# Patient Record
Sex: MALE | Race: WHITE
[De-identification: names, ages, dates, MRNs, and addresses within clinical notes are randomized per-mention and may not be internally consistent; named-entity substitution may affect disease eponyms.]

---

## 2021-11-17 ENCOUNTER — HOSPITAL ENCOUNTER (INPATIENT)
Dept: HOSPITAL 35 - ER | Age: 76
LOS: 4 days | Discharge: HOME | DRG: 177 | End: 2021-11-21
Attending: HOSPITALIST | Admitting: HOSPITALIST
Payer: MEDICARE

## 2021-11-17 VITALS — WEIGHT: 143 LBS | HEIGHT: 70 IN | BODY MASS INDEX: 20.47 KG/M2

## 2021-11-17 VITALS — DIASTOLIC BLOOD PRESSURE: 83 MMHG | SYSTOLIC BLOOD PRESSURE: 179 MMHG
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
## 2021-11-17 DIAGNOSIS — E78.5: ICD-10-CM

## 2021-11-17 DIAGNOSIS — I10: ICD-10-CM

## 2021-11-17 DIAGNOSIS — Z85.118: ICD-10-CM

## 2021-11-17 DIAGNOSIS — U07.1: Primary | ICD-10-CM

## 2021-11-17 DIAGNOSIS — F03.90: ICD-10-CM

## 2021-11-17 DIAGNOSIS — J96.01: ICD-10-CM

## 2021-11-17 DIAGNOSIS — Z92.21: ICD-10-CM

## 2021-11-17 DIAGNOSIS — E86.0: ICD-10-CM

## 2021-11-17 DIAGNOSIS — E46: ICD-10-CM

## 2021-11-17 DIAGNOSIS — J12.82: ICD-10-CM

## 2021-11-17 DIAGNOSIS — T50.905A: ICD-10-CM

## 2021-11-17 DIAGNOSIS — F17.210: ICD-10-CM

## 2021-11-17 DIAGNOSIS — J44.0: ICD-10-CM

## 2021-11-17 DIAGNOSIS — N40.0: ICD-10-CM

## 2021-11-17 DIAGNOSIS — Z79.899: ICD-10-CM

## 2021-11-17 DIAGNOSIS — Z71.6: ICD-10-CM

## 2021-11-17 DIAGNOSIS — J44.1: ICD-10-CM

## 2021-11-17 DIAGNOSIS — R53.81: ICD-10-CM

## 2021-11-17 LAB
ALBUMIN SERPL-MCNC: 3.1 G/DL (ref 3.4–5)
ALT SERPL-CCNC: 48 U/L (ref 30–65)
ANION GAP SERPL CALC-SCNC: 14 MMOL/L (ref 7–16)
AST SERPL-CCNC: 41 U/L (ref 15–37)
BACTERIA-REFLEX: (no result) /HPF
BASOPHILS NFR BLD AUTO: 0.3 % (ref 0–2)
BE(VIVO): -2.2 MMOL/L
BILIRUB DIRECT SERPL-MCNC: 0.2 MG/DL
BILIRUB SERPL-MCNC: 0.6 MG/DL (ref 0.2–1)
BILIRUB UR-MCNC: NEGATIVE MG/DL
BUN SERPL-MCNC: 13 MG/DL (ref 7–18)
CALCIUM SERPL-MCNC: 8.5 MG/DL (ref 8.5–10.1)
CHLORIDE SERPL-SCNC: 97 MMOL/L (ref 98–107)
CO2 SERPL-SCNC: 21 MMOL/L (ref 21–32)
COLOR UR: YELLOW
CREAT SERPL-MCNC: 1 MG/DL (ref 0.7–1.3)
EOSINOPHIL NFR BLD: 0 % (ref 0–3)
ERYTHROCYTE [DISTWIDTH] IN BLOOD BY AUTOMATED COUNT: 12.9 % (ref 10.5–14.5)
GLUCOSE SERPL-MCNC: 102 MG/DL (ref 74–106)
GRANULOCYTES NFR BLD MANUAL: 79.5 % (ref 36–66)
HCO3 BLD-SCNC: 20 MMOL/L (ref 22–26)
HCT VFR BLD CALC: 42.6 % (ref 42–52)
HGB BLD-MCNC: 14.2 GM/DL (ref 14–18)
KETONES UR STRIP-MCNC: (no result) MG/DL
LYMPHOCYTES NFR BLD AUTO: 10.6 % (ref 24–44)
MAGNESIUM SERPL-MCNC: 1.5 MG/DL (ref 1.8–2.4)
MCH RBC QN AUTO: 32.4 PG (ref 26–34)
MCHC RBC AUTO-ENTMCNC: 33.4 G/DL (ref 28–37)
MCV RBC: 97 FL (ref 80–100)
MONOCYTES NFR BLD: 9.6 % (ref 1–8)
MUCUS: (no result) STRN/LPF
NEUTROPHILS # BLD: 4.1 THOU/UL (ref 1.4–8.2)
PCO2 BLD: 28 MMHG (ref 35–45)
PHOSPHATE SERPL-MCNC: 2.8 MG/DL (ref 2.5–4.9)
PLATELET # BLD: 154 THOU/UL (ref 150–400)
PO2 BLD: 68.4 MMHG (ref 80–100)
POTASSIUM SERPL-SCNC: 3.8 MMOL/L (ref 3.5–5.1)
PROT SERPL-MCNC: 6.1 G/DL (ref 6.4–8.2)
RBC # BLD AUTO: 4.39 MIL/UL (ref 4.5–6)
RBC # UR STRIP: (no result) /UL
SODIUM SERPL-SCNC: 132 MMOL/L (ref 136–145)
SP GR UR STRIP: 1.01 (ref 1–1.03)
SQUAMOUS: (no result) /LPF (ref 0–3)
URINE CLARITY: CLEAR
URINE GLUCOSE-RANDOM*: NEGATIVE
URINE LEUKOCYTES-REFLEX: NEGATIVE
URINE NITRITE-REFLEX: NEGATIVE
URINE PROTEIN (DIPSTICK): NEGATIVE
URINE WBC-REFLEX: (no result) /HPF (ref 0–5)
UROBILINOGEN UR STRIP-ACNC: 0.2 E.U./DL (ref 0.2–1)
WBC # BLD AUTO: 5.1 THOU/UL (ref 4–11)

## 2021-11-17 PROCEDURE — 10080 I&D PILONIDAL CYST SIMPLE: CPT

## 2021-11-17 NOTE — EMS
[address]   [address]                     EMS Patient Care Report       
_______________________________________________________________________________
 
Name:       MARIANNE WEATHERS                  Room #:         170-5       ADM IN  
M.R.#:      [id number]                       Account #:      [id number]  
Admission:  21    Attend Phys:    Anthony Bui MD     
Discharge:              Date of Birth:  [birth year]  
                                                          Report #: [id number]
                                                                    [id number]
_______________________________________________________________________________
THIS REPORT FOR:   //name//                          
 
Report Transmitted: 2021 13:18
EMS Care Summary
[place], [place]/[facility]
Incident [id number] @ 2021 20:31
 
Incident Location
[address] [place], [place] [id number]
 
Patient
MARIANNE WEATHERS
Male, 75 Years
 1945
 
Patient Address
[address] [place], [place] [id number]
 
Patient History
None Reported,
 
Patient Allergies
No known allergies,
 
Patient Medications
None Reported,
 
Chief Complaint
weakness
 
Disposition
Transported No Lights/[place]
 
Dispatch Reason
Sick Person
 
Transported To
[facility]
 
Narrative
Upon arrival PT was standing outside of apartment complex. PT had a CC of 
weakness and stated that his wife had just been diagnosed with covid-19 and PT 
 
 
 
[address]   [address]                     EMS Patient Care Report       
_______________________________________________________________________________
 
Name:       MARIANNE WEATHERS                  Room #:         170-5       ADM IN  
M.RONAL.#:      [id number]                       Account #:      [id number]  
Admission:  21    Attend Phys:    Anthony Bui MD     
Discharge:              Date of Birth:  [birth year]  
                                                          Report #: [id number]
                                                                    [id number]
_______________________________________________________________________________
believes he has contracted the virus as well. PT was assisted to stretcher and 
taken to back of ambulance for further medical evaluation and intervention. PT 
was then monitored while en route for any change in condition. 
 
Initial Vitals
@20:42P: 66,R: 18,BP: 177/78,Pain: 0/10,GCS: 15,SpO2: 94,Revised Trauma: 12,
@20:51P: 90,R: 18,BP: 174/78,Pain: 0/10,GCS: 15,SpO2: 94,Revised Trauma: 12,
 
Assessments
@20:42MENTAL:No Abnormalities,SKIN:No Abnormalities,HEENT:Head/Face: No 
Abnormalities,Eyes: No Abnormalities,Neck/Airway: No Abnormalities,LUNG 
SOUNDS:General: No Abnormalities,Left Upper: No Abnormalities,Right Upper: No 
Abnormalities,Left Lower: No Abnormalities,Right Lower: No 
Abnormalities,ABDOMEN:General: No Abnormalities,Left Upper: No 
Abnormalities,Right Upper: No Abnormalities,Left Lower: No Abnormalities,Right 
Lower: No Abnormalities,PELVIS//GI:No Abnormalities,EXTREMITIES:Capillary 
Refill: Left Upper: < 2 Sec,Capillary Refill: Right Upper: < 2 
Sec,PULSE:Radial: 2+ Normal,NEURO:No Abnormalities, 
 
Impression
Generalized Weakness
 
Procedures
@20:42 ALS Assessment Response: UnchangedSucceeded
 
 
Timeline
20:29,Call Received
20:29,Dispatch Notified
20:31,Dispatched
20:31,En Route
20:41,On Scene
20:42,At Patient
20:42,BP: 177/78 M,PULSE: 66,RR: 18 R,SPO2: 94 Ox,ETCO2:  ,BG: ,PAIN: 0,GCS: 15,
20:42,Depart Scene
20:42,ALS Assessment,Response: UnchangedSucceeded,
20:51,BP: 174/78 M,PULSE: 90,RR: 18 R,SPO2: 94 Ox,ETCO2:  ,BG: ,PAIN: 0,GCS: 15,
20:52,At Destination
21:01,Call Closed
 
Disclaimer
v1.1     Copyright  [employer]
This EMS Care Summary contains data elements from the applicable legal record 
(which may be displayed differently). It is designed to provide pertinent 
information for the following purposes: continuity of care, clinical quality, 
and state data reporting. The complete legal record is available to ED staff 
 
 
 
[address]   [address]                     EMS Patient Care Report       
_______________________________________________________________________________
 
Name:       MARIANNE WEATHERS                  Room #:         170-5       ADM IN  
M.R.#:      [id number]                       Account #:      [id number]  
Admission:  21    Attend Phys:    Anthony Bui MD     
Discharge:              Date of Birth:  [birth year]  
                                                          Report #: [id number]
                                                                    [id number]
_______________________________________________________________________________
and administrators of the receiving hospital in [facility]. All data 
is provided "as is."

## 2021-11-18 VITALS — SYSTOLIC BLOOD PRESSURE: 146 MMHG | DIASTOLIC BLOOD PRESSURE: 50 MMHG

## 2021-11-18 VITALS — DIASTOLIC BLOOD PRESSURE: 71 MMHG | SYSTOLIC BLOOD PRESSURE: 127 MMHG

## 2021-11-18 VITALS — SYSTOLIC BLOOD PRESSURE: 137 MMHG | DIASTOLIC BLOOD PRESSURE: 83 MMHG

## 2021-11-18 LAB
ALBUMIN SERPL-MCNC: 2.8 G/DL (ref 3.4–5)
ALT SERPL-CCNC: 44 U/L (ref 30–65)
ANION GAP SERPL CALC-SCNC: 13 MMOL/L (ref 7–16)
AST SERPL-CCNC: 44 U/L (ref 15–37)
BILIRUB SERPL-MCNC: 0.5 MG/DL (ref 0.2–1)
BUN SERPL-MCNC: 12 MG/DL (ref 7–18)
CALCIUM SERPL-MCNC: 8 MG/DL (ref 8.5–10.1)
CHLORIDE SERPL-SCNC: 102 MMOL/L (ref 98–107)
CO2 SERPL-SCNC: 20 MMOL/L (ref 21–32)
CREAT SERPL-MCNC: 0.9 MG/DL (ref 0.7–1.3)
ERYTHROCYTE [DISTWIDTH] IN BLOOD BY AUTOMATED COUNT: 12.9 % (ref 10.5–14.5)
GLUCOSE SERPL-MCNC: 102 MG/DL (ref 74–106)
HCT VFR BLD CALC: 40.1 % (ref 42–52)
HGB BLD-MCNC: 13.6 GM/DL (ref 14–18)
MCH RBC QN AUTO: 32.9 PG (ref 26–34)
MCHC RBC AUTO-ENTMCNC: 34 G/DL (ref 28–37)
MCV RBC: 96.8 FL (ref 80–100)
PLATELET # BLD: 140 THOU/UL (ref 150–400)
POTASSIUM SERPL-SCNC: 3.6 MMOL/L (ref 3.5–5.1)
PROT SERPL-MCNC: 5.6 G/DL (ref 6.4–8.2)
RBC # BLD AUTO: 4.14 MIL/UL (ref 4.5–6)
SODIUM SERPL-SCNC: 135 MMOL/L (ref 136–145)
WBC # BLD AUTO: 5.5 THOU/UL (ref 4–11)

## 2021-11-18 NOTE — EKG
[facility] [address] [place]  [address]
Phone:  [phone]                    ELECTROCARDIOGRAM REPORT      
_______________________________________________________________________________
 
Name:       MARIANNE WEATHERS                  Room #:         170-5       ADM IN  
M.R.#:      [id number]     Account #:      [id number]  
Admission:  21    Attend Phys:    Anthony Bui MD     
Discharge:              Date of Birth:  [birth year]  
                                                          Report #: [id number]
   [id number]
_______________________________________________________________________________
                         [facility] ED
                                       
Test Date:    2021               Test Time:    21:41:22
Pat Name:     MARIANNE WEATHERS             Department:   
Patient ID:   [id number]            Room:         170
Gender:       M                        Technician:   ROBERT
:          1945               Requested By: Bay Nguyễn
Order Number: [id number] MD:   Austin Sue
                                 Measurements
Intervals                              Axis          
Rate:         74                       P:            83
DE:           185                      QRS:          -52
QRSD:         111                      T:            105
QT:           412                                    
QTc:          457                                    
                           Interpretive Statements
Sinus rhythm
LVH with IVCD, LAD and secondary repol abnrm
No previous ECG available for comparison
Electronically Signed On 2021 7:07:12 CST by Austin Sue
[email or web address]
 
 
 
 
 
 
 
 
 
 
 
 
 
 
 
 
 
 
 
 
 
 
  <ELECTRONICALLY SIGNED>
   By: Austin Sue MD, [facility]    
  21     0707
D: 21                           _____________________________________
T: 21 2141                           Austin Sue MD, FACC      /EPI

## 2021-11-19 VITALS — DIASTOLIC BLOOD PRESSURE: 59 MMHG | SYSTOLIC BLOOD PRESSURE: 131 MMHG

## 2021-11-19 VITALS — SYSTOLIC BLOOD PRESSURE: 125 MMHG | DIASTOLIC BLOOD PRESSURE: 66 MMHG

## 2021-11-19 VITALS — DIASTOLIC BLOOD PRESSURE: 60 MMHG | SYSTOLIC BLOOD PRESSURE: 163 MMHG

## 2021-11-19 VITALS — SYSTOLIC BLOOD PRESSURE: 148 MMHG | DIASTOLIC BLOOD PRESSURE: 62 MMHG

## 2021-11-20 VITALS — SYSTOLIC BLOOD PRESSURE: 137 MMHG | DIASTOLIC BLOOD PRESSURE: 55 MMHG

## 2021-11-20 VITALS — SYSTOLIC BLOOD PRESSURE: 139 MMHG | DIASTOLIC BLOOD PRESSURE: 66 MMHG

## 2021-11-20 VITALS — SYSTOLIC BLOOD PRESSURE: 153 MMHG | DIASTOLIC BLOOD PRESSURE: 47 MMHG

## 2021-11-20 VITALS — DIASTOLIC BLOOD PRESSURE: 56 MMHG | SYSTOLIC BLOOD PRESSURE: 132 MMHG

## 2021-11-20 VITALS — SYSTOLIC BLOOD PRESSURE: 145 MMHG | DIASTOLIC BLOOD PRESSURE: 45 MMHG

## 2021-11-20 LAB
BASOPHILS NFR BLD AUTO: 0.7 % (ref 0–2)
EOSINOPHIL NFR BLD: 0 % (ref 0–3)
ERYTHROCYTE [DISTWIDTH] IN BLOOD BY AUTOMATED COUNT: 13.4 % (ref 10.5–14.5)
GRANULOCYTES NFR BLD MANUAL: 77.4 % (ref 36–66)
HCT VFR BLD CALC: 37.5 % (ref 42–52)
HGB BLD-MCNC: 12.7 GM/DL (ref 14–18)
LYMPHOCYTES NFR BLD AUTO: 14.7 % (ref 24–44)
MCH RBC QN AUTO: 32.7 PG (ref 26–34)
MCHC RBC AUTO-ENTMCNC: 34 G/DL (ref 28–37)
MCV RBC: 96.2 FL (ref 80–100)
MONOCYTES NFR BLD: 7.2 % (ref 1–8)
NEUTROPHILS # BLD: 4.9 THOU/UL (ref 1.4–8.2)
PLATELET # BLD: 152 THOU/UL (ref 150–400)
RBC # BLD AUTO: 3.89 MIL/UL (ref 4.5–6)
WBC # BLD AUTO: 6.4 THOU/UL (ref 4–11)

## 2021-11-21 VITALS — SYSTOLIC BLOOD PRESSURE: 146 MMHG | DIASTOLIC BLOOD PRESSURE: 67 MMHG

## 2021-11-21 VITALS — DIASTOLIC BLOOD PRESSURE: 52 MMHG | SYSTOLIC BLOOD PRESSURE: 145 MMHG

## 2021-11-21 VITALS — DIASTOLIC BLOOD PRESSURE: 67 MMHG | SYSTOLIC BLOOD PRESSURE: 146 MMHG
